# Patient Record
Sex: MALE | NOT HISPANIC OR LATINO | Employment: OTHER | URBAN - METROPOLITAN AREA
[De-identification: names, ages, dates, MRNs, and addresses within clinical notes are randomized per-mention and may not be internally consistent; named-entity substitution may affect disease eponyms.]

---

## 2017-09-15 ENCOUNTER — HOSPITAL ENCOUNTER (OUTPATIENT)
Facility: CLINIC | Age: 42
Discharge: HOME | End: 2017-09-15
Attending: ORTHOPAEDIC SURGERY | Admitting: ORTHOPAEDIC SURGERY
Payer: OTHER MISCELLANEOUS

## 2017-09-15 ENCOUNTER — SURGERY (OUTPATIENT)
Age: 42
End: 2017-09-15

## 2017-09-15 VITALS — HEIGHT: 70 IN | WEIGHT: 185 LBS | BODY MASS INDEX: 26.48 KG/M2

## 2017-09-19 ENCOUNTER — HOSPITAL ENCOUNTER (OUTPATIENT)
Facility: CLINIC | Age: 42
End: 2017-09-19
Attending: COLON & RECTAL SURGERY | Admitting: COLON & RECTAL SURGERY

## 2017-09-20 RX ORDER — LIDOCAINE 40 MG/G
CREAM TOPICAL
Status: CANCELLED | OUTPATIENT
Start: 2017-09-20

## 2017-09-20 RX ORDER — ONDANSETRON 2 MG/ML
4 INJECTION INTRAMUSCULAR; INTRAVENOUS
Status: CANCELLED | OUTPATIENT
Start: 2017-09-20

## 2017-09-27 ENCOUNTER — HOSPITAL ENCOUNTER (OUTPATIENT)
Facility: CLINIC | Age: 42
Discharge: HOME | End: 2017-09-27
Attending: COLON & RECTAL SURGERY | Admitting: COLON & RECTAL SURGERY

## 2017-09-27 ENCOUNTER — SURGERY (OUTPATIENT)
Age: 42
End: 2017-09-27

## 2017-09-27 VITALS
HEIGHT: 70 IN | BODY MASS INDEX: 26.48 KG/M2 | SYSTOLIC BLOOD PRESSURE: 141 MMHG | OXYGEN SATURATION: 100 % | DIASTOLIC BLOOD PRESSURE: 109 MMHG | HEART RATE: 57 BPM | WEIGHT: 185 LBS

## 2017-09-27 LAB — COLONOSCOPY: NORMAL

## 2017-09-27 PROCEDURE — G0500 MOD SEDAT ENDO SERVICE >5YRS: HCPCS | Performed by: COLON & RECTAL SURGERY

## 2017-09-27 PROCEDURE — 88305 TISSUE EXAM BY PATHOLOGIST: CPT | Mod: 26 | Performed by: COLON & RECTAL SURGERY

## 2017-09-27 PROCEDURE — 45380 COLONOSCOPY AND BIOPSY: CPT | Performed by: COLON & RECTAL SURGERY

## 2017-09-27 PROCEDURE — 88305 TISSUE EXAM BY PATHOLOGIST: CPT | Performed by: COLON & RECTAL SURGERY

## 2017-09-27 PROCEDURE — 25000128 H RX IP 250 OP 636: Performed by: COLON & RECTAL SURGERY

## 2017-09-27 PROCEDURE — 99153 MOD SED SAME PHYS/QHP EA: CPT

## 2017-09-27 RX ORDER — ONDANSETRON 2 MG/ML
4 INJECTION INTRAMUSCULAR; INTRAVENOUS
Status: DISCONTINUED | OUTPATIENT
Start: 2017-09-27 | End: 2017-09-27 | Stop reason: HOSPADM

## 2017-09-27 RX ORDER — FENTANYL CITRATE 50 UG/ML
INJECTION, SOLUTION INTRAMUSCULAR; INTRAVENOUS PRN
Status: DISCONTINUED | OUTPATIENT
Start: 2017-09-27 | End: 2017-09-27 | Stop reason: HOSPADM

## 2017-09-27 RX ORDER — LIDOCAINE 40 MG/G
CREAM TOPICAL
Status: DISCONTINUED | OUTPATIENT
Start: 2017-09-27 | End: 2017-09-27 | Stop reason: HOSPADM

## 2017-09-27 RX ADMIN — FENTANYL CITRATE 100 MCG: 50 INJECTION, SOLUTION INTRAMUSCULAR; INTRAVENOUS at 08:44

## 2017-09-27 RX ADMIN — MIDAZOLAM HYDROCHLORIDE 2 MG: 1 INJECTION, SOLUTION INTRAMUSCULAR; INTRAVENOUS at 08:51

## 2017-09-27 RX ADMIN — FENTANYL CITRATE 50 MCG: 50 INJECTION, SOLUTION INTRAMUSCULAR; INTRAVENOUS at 08:54

## 2017-09-27 RX ADMIN — MIDAZOLAM HYDROCHLORIDE 2 MG: 1 INJECTION, SOLUTION INTRAMUSCULAR; INTRAVENOUS at 08:45

## 2017-09-28 LAB — COPATH REPORT: NORMAL

## 2017-10-02 ENCOUNTER — HOSPITAL ENCOUNTER (OUTPATIENT)
Dept: LAB | Facility: CLINIC | Age: 42
Discharge: HOME | End: 2017-10-02
Attending: COLON & RECTAL SURGERY | Admitting: COLON & RECTAL SURGERY

## 2017-10-02 PROCEDURE — 87177 OVA AND PARASITES SMEARS: CPT | Performed by: COLON & RECTAL SURGERY

## 2017-10-02 PROCEDURE — 87209 SMEAR COMPLEX STAIN: CPT | Performed by: COLON & RECTAL SURGERY

## 2017-10-03 LAB
O+P STL MICRO: NORMAL
O+P STL MICRO: NORMAL
SPECIMEN SOURCE: NORMAL

## 2023-09-21 ENCOUNTER — TRANSCRIBE ORDERS (OUTPATIENT)
Dept: OTHER | Age: 48
End: 2023-09-21

## 2023-09-21 DIAGNOSIS — D35.2 PITUITARY ADENOMA (H): Primary | ICD-10-CM

## 2023-09-21 DIAGNOSIS — G50.0 TRIGEMINAL NEURALGIA: ICD-10-CM

## 2023-10-03 ENCOUNTER — TELEPHONE (OUTPATIENT)
Dept: NEUROSURGERY | Facility: CLINIC | Age: 48
End: 2023-10-03
Payer: COMMERCIAL

## 2023-10-03 NOTE — TELEPHONE ENCOUNTER
[facility] Call Center    Phone Message    May a detailed message be left on voicemail: yes     Reason for Call: Other: Patients wife Kelsi is calling to schedule neurosurgery referral. A referral was placed to neuology for trigeminal neuralgia. Please review and call back to schedule.     Action Taken: Message routed to:  [facility] ([facility]    Travel Screening: Not Applicable

## 2023-10-10 NOTE — TELEPHONE ENCOUNTER
Action Mary Anne Choi on 10/10/2023 at 1:25 PM   Action Taken Called Pt to ask where they were seen in [place]. Pt's SO answered and stated that Pt had been seen with [facility]. Also stated they were bringing paper copies of  Pt's record to Appt tomorrow. -JA     Records Requested     October 10, 2023 1:27 PM   [id number] [place] [address]  Raghav   Outcome Called to have imaging immediately pushed to PACS. Imaging resolved into PACS. -JA     Records Requested     October 10, 2023 1:29 PM   [id number] [place] [address]  [address] [place] [address]   P: [phone]   Fax: [phone]   Outcome Sent Urgent request for imaging disc to be overnighted w/ shipping label. Tracking # [id number]       Action 10/12/23 MV 12.49pm   Action Taken Imaging disk received and sent to N for processing       RECORDS RECEIVED FROM: Care Everywhere   REASON FOR VISIT: Pituitary adenoma  Trigeminal neuralgia    Date of Appt: 10/11/23 3:00 pm    NOTES (FOR ALL VISITS) STATUS DETAILS   OFFICE NOTE from referring provider Care Everywhere 9/21/23 (Transcribed Orders), 9/19/23  Adrienne Goodwin MD @[facility]     OFFICE NOTE from other specialist Care Everywhere 9/20/23 Laurel Waters OD  @[facility] Eye    1/6/22 Compa Subramanian MD @[facility]    4/30/21 Garth Polanco MD  @[facility] Neuro     DISCHARGE REPORT from the ER Care Everywhere 5/8/21 Lillian Miller MD  @[facility] ED     MEDICATION LIST Internal    IMAGING  (FOR ALL VISITS)     X-RAY PACS [facility]  9/26/23  XR Lumbar Spine  9/26/23 XR Cervical Spine     MRI (HEAD, NECK, SPINE) In process [facility]  10/3/23  MRA Head Brain    [facility]*  6/22/22  MR Lumbar Spine  2/23/22  MR Brain  2/23/22  MR Cervical Spine  5/17/21 MR Brain

## 2023-10-11 ENCOUNTER — PRE VISIT (OUTPATIENT)
Dept: NEUROSURGERY | Facility: CLINIC | Age: 48
End: 2023-10-11

## 2023-10-12 NOTE — TELEPHONE ENCOUNTER
RECORDS RECEIVED FROM: Care Everywhere   REASON FOR VISIT: Pituitary adenoma/TN   Date of Appt: 10/13/23 1:00pm    NOTES (FOR ALL VISITS) STATUS DETAILS   OFFICE NOTE from referring provider Care Everywhere 9/21/23 (Transcribed Orders), 9/19/23  Alysha Newman, Adrienne Domínguez MD @[facility]      OFFICE NOTE from other specialist Care Everywhere 9/20/23 Laurel Waters OD  @[facility] Eye     1/6/22 Compa Subramanian MD @[facility]     4/30/21 Garth Polanco MD  @[facility] Neuro      DISCHARGE REPORT from the ER Care Everywhere 5/8/21 Lillian Miller MD  @[facility] ED      MEDICATION LIST Internal    IMAGING  (FOR ALL VISITS)     X-RAY PACS [facility]  9/26/23  XR Lumbar Spine  9/26/23 XR Cervical Sp     MRI (HEAD, NECK, SPINE) PACS/Imaging disc received  [facility]  10/3/23  MRA Head Brain     [facility]*  6/22/22  MR Lumbar Spine  2/23/22  MR Brain  2/23/22  MR Cervical Spine  5/17/21 MR Brain

## 2023-10-13 ENCOUNTER — PRE VISIT (OUTPATIENT)
Dept: NEUROSURGERY | Facility: CLINIC | Age: 48
End: 2023-10-13

## 2023-10-13 ENCOUNTER — VIRTUAL VISIT (OUTPATIENT)
Dept: NEUROSURGERY | Facility: CLINIC | Age: 48
End: 2023-10-13
Payer: COMMERCIAL

## 2023-10-13 VITALS — WEIGHT: 198 LBS | BODY MASS INDEX: 27.72 KG/M2 | HEIGHT: 71 IN

## 2023-10-13 DIAGNOSIS — D35.2 PITUITARY ADENOMA (H): ICD-10-CM

## 2023-10-13 DIAGNOSIS — G50.0 TRIGEMINAL NEURALGIA: Primary | ICD-10-CM

## 2023-10-13 PROCEDURE — 99203 OFFICE O/P NEW LOW 30 MIN: CPT | Mod: 95 | Performed by: NURSE PRACTITIONER

## 2023-10-13 ASSESSMENT — PAIN SCALES - GENERAL: PAINLEVEL: MODERATE PAIN (5)

## 2023-10-13 NOTE — PROGRESS NOTES
Virtual Visit Details    Type of service:  Video Visit     Originating Location (pt. Location): Home    Distant Location (provider location):  On-site  Platform used for Video Visit: [facility]  Department of Neurosurgery      Name: Ovidio Sharma  MRN: [id number]  Age: 48 year old  : 1975  Referring provider: No ref. provider found  10/13/2023      Chief Complaint:   Pituitary microadenoma  Right trigeminal neuralgia  Abnormal CTA brain    History of Present Illness:   Ovidio Sharma is a 48 year old male with a history of headaches, pituitary microadenoma who is here today for further evaluation and management of trigeminal neuralgia.  Today I had a video visit with the patient and his wife, Kelsi.  Patient has a long-term history of headaches and had imaging in 2019 and was found to have a pituitary microadenoma. He had a few follow-ups with neurology in [place], [place].  Most recent brain MRI was done in 2022 which showed a Stable circumscribed 9 mm hypoenhancing RIGHT pituitary lesion, compatible with a pituitary microadenoma. He has not had any additional brain MRIs since this.    Patient reports right-sided facial pain, mainly in right V1 and V2 distributions at least since 2019.  He reports intermittent, multiple, daily episodes of, sharp, shooting pain which last for few seconds.  He was previously treated with carbamazepine which significantly helped with the symptoms, but he eventually stopped taking this medication.  Recently, he has been having an increase in facial pain symptoms.  He is not taking carbamazepine at this time.    Patient also reports that on a previous CT angiogram from 2019 he was found to have a right pericallosal artery outpouching.  Patient completed an MR angiogram recently which is reportedly normal.    Review of Systems:   Pertinent items are noted in HPI or as in patient entered ROS below, remainder of complete ROS is negative.     "    No data to display                     Allergies:   Guaifenesin and Dextromethorphan hbr      Past Medical History:  Past Medical History:   Diagnosis Date    Asthma     Hypertension     Migraine      There is no problem list on file for this patient.       Past Surgical History:  Past Surgical History:   Procedure Laterality Date    COLONOSCOPY N/A 2017    Procedure: COMBINED COLONOSCOPY, SINGLE OR MULTIPLE BIOPSY/POLYPECTOMY BY BIOPSY;  COLONOSCOPY ;  Surgeon: Phyllis Cardenas MD;  Location: [facility]    HERNIA REPAIR      none         Family History:   No family history on file.      Social History:   Social History     Tobacco Use    Smoking status: Former     Packs/day: 1     Types: Cigarettes     Quit date: 2022     Years since quittin.7    Smokeless tobacco: Never    Tobacco comments:     1 pack every 2 days   Substance Use Topics    Alcohol use: No     Comment: socially    Drug use: Yes     Types: Marijuana        Physical Exam:   Ht 1.803 m (5' 11\")   Wt 89.8 kg (198 lb)   BMI 27.62 kg/m     General: No acute distress.   Neuro: The patient is fully oriented. Speech is normal. Psych: Normal mood and affect. Behavior is normal.      Imaging:  10/3/2023 MR angiogram:  Normal MRA [place]    2022 MRI brain:  IMPRESSION: Stable circumscribed 9 mm hypoenhancing RIGHT pituitary   lesion, compatible with a pituitary microadenoma.      Assessment:  Pituitary microadenoma  Right trigeminal neuralgia  Abnormal CTA brain    Plan:  For the right trigeminal neuralgia, we will restart him on carbamazepine 100 mg twice daily.  If this is not enough for his facial pain, he can slowly increase the dose up to 300 mg twice daily.  Patient had CBC and BMP recently which are within normal levels.  Patient to follow-up with me in 3 months. His prior brain MRI showed mild involvement of the right cavernous sinus which could be the explanation for his right sided facial pain. Will complete a brain " MRI to evaluate. Prior brain MRI is requested to be pushed to PACS.     For the pituitary microadenoma, he would like to establish care with a provider in [facility].  We will refer him to our colleagues after brain MRI.    Patient has a history of abnormal CTA in the past with an outpouching off right pericallosal artery.  We will review recent MRA with Dr. Irving and will contact the patient with follow-up plans.      Zakia Dominguez CNP  Department of Neurosurgery    I spent 40 minutes on patient care activities related to this encounter on the date of service, including time spent reviewing the chart, obtaining history and examination and in counseling the patient, and in documentation in the electronic medical record.

## 2023-10-13 NOTE — NURSING NOTE
Is the patient currently in the state of [place]? YES    Visit mode:VIDEO    If the visit is dropped, the patient can be reconnected by: VIDEO VISIT: Text to cell phone:   Telephone Information:   Mobile [phone]       Will anyone else be joining the visit? NO  (If patient encounters technical issues they should call [phone])    How would you like to obtain your AVS? MyChart    Are changes needed to the allergy or medication list? No    Reason for visit: Consult    Steffi DE PAZ      Note for check-in: Unable to update employer size, etc for insurance, due to pt not currently working, but still waiting for disability paperwork to go through.

## 2023-10-13 NOTE — LETTER
10/13/2023       RE: Ovidio Sharma  [address]  [place] [address]       Dear Colleague,    Thank you for referring your patient, Ovidio Sharma, to the [facility] NEUROSURGERY CLINIC [place] at [facility]. Please see a copy of my visit note below.      [facility]  Department of Neurosurgery      Name: Ovidio Sharma  MRN: [id number]  Age: 48 year old  : 1975  Referring provider: No ref. provider found  10/13/2023      Chief Complaint:   Pituitary microadenoma  Right trigeminal neuralgia  Abnormal CTA brain    History of Present Illness:   Ovidio Sharma is a 48 year old male with a history of headaches, pituitary microadenoma who is here today for further evaluation and management of trigeminal neuralgia.  Today I had a video visit with the patient and his wife, Kelsi.  Patient has a long-term history of headaches and had imaging in 2019 and was found to have a pituitary microadenoma. He had a few follow-ups with neurology in [place], [place].  Most recent brain MRI was done in 2022 which showed a Stable circumscribed 9 mm hypoenhancing RIGHT pituitary lesion, compatible with a pituitary microadenoma. He has not had any additional brain MRIs since this.    Patient reports right-sided facial pain, mainly in right V1 and V2 distributions at least since .  He reports intermittent, multiple, daily episodes of, sharp, shooting pain which last for few seconds.  He was previously treated with carbamazepine which significantly helped with the symptoms, but he eventually stopped taking this medication.  Recently, he has been having an increase in facial pain symptoms.  He is not taking carbamazepine at this time.    Patient also reports that on a previous CT angiogram from 2019 he was found to have a right pericallosal artery outpouching.  Patient completed an MR angiogram recently which is reportedly  "normal.    Review of Systems:   Pertinent items are noted in HPI or as in patient entered ROS below, remainder of complete ROS is negative.        No data to display                     Allergies:   Guaifenesin and Dextromethorphan hbr      Past Medical History:  Past Medical History:   Diagnosis Date    Asthma     Hypertension     Migraine      There is no problem list on file for this patient.       Past Surgical History:  Past Surgical History:   Procedure Laterality Date    COLONOSCOPY N/A 2017    Procedure: COMBINED COLONOSCOPY, SINGLE OR MULTIPLE BIOPSY/POLYPECTOMY BY BIOPSY;  COLONOSCOPY ;  Surgeon: Phyllis Cardenas MD;  Location:  GI    HERNIA REPAIR      none         Family History:   No family history on file.      Social History:   Social History     Tobacco Use    Smoking status: Former     Packs/day: 1     Types: Cigarettes     Quit date: 2022     Years since quittin.7    Smokeless tobacco: Never    Tobacco comments:     1 pack every 2 days   Substance Use Topics    Alcohol use: No     Comment: socially    Drug use: Yes     Types: Marijuana        Physical Exam:   Ht 1.803 m (5' 11\")   Wt 89.8 kg (198 lb)   BMI 27.62 kg/m     General: No acute distress.   Neuro: The patient is fully oriented. Speech is normal. Psych: Normal mood and affect. Behavior is normal.      Imaging:  10/3/2023 MR angiogram:  Normal MRA [place]    2022 MRI brain:  IMPRESSION: Stable circumscribed 9 mm hypoenhancing RIGHT pituitary   lesion, compatible with a pituitary microadenoma.      Assessment:  Pituitary microadenoma  Right trigeminal neuralgia  Abnormal CTA brain    Plan:  For the right trigeminal neuralgia, we will restart him on carbamazepine 100 mg twice daily.  If this is not enough for his facial pain, he can slowly increase the dose up to 300 mg twice daily.  Patient had CBC and BMP recently which are within normal levels.  Patient to follow-up with me in 3 months. His prior brain " MRI showed mild involvement of the right cavernous sinus which could be the explanation for his right sided facial pain. Will complete a brain MRI to evaluate.     For the pituitary microadenoma, he would like to establish care with a provider in [facility].  We will refer him to our colleagues after brain MRI.    Patient has a history of abnormal CTA in the past with an outpouching off right pericallosal artery.  We will review recent MRA with Dr. Irving and will contact the patient with follow-up plans.    I spent 40 minutes on patient care activities related to this encounter on the date of service, including time spent reviewing the chart, obtaining history and examination and in counseling the patient, and in documentation in the electronic medical record.        Again, thank you for allowing me to participate in the care of your patient.      Sincerely,    KALI Contreras CNP

## 2023-10-13 NOTE — PATIENT INSTRUCTIONS
Restart carbamazepine at 100 mg twice daily and gradually increase the dose to 300 mg twice daily.     MRI Brain as ordered.     Establish care with Edilma Valero PA-C for pituitary microadenoma follow up.     Follow-up with me in 3 months for Trigeminal Neuralgia management.

## 2023-11-09 ENCOUNTER — TELEPHONE (OUTPATIENT)
Dept: NEUROSURGERY | Facility: CLINIC | Age: 48
End: 2023-11-09
Payer: COMMERCIAL

## 2023-11-09 NOTE — TELEPHONE ENCOUNTER
Left detailed message for pt, to call back and confirm his new appt for 11/22 rescheduled from 11/16 due to align with MRI test result.

## 2023-11-20 ENCOUNTER — CARE COORDINATION (OUTPATIENT)
Dept: NEUROSURGERY | Facility: CLINIC | Age: 48
End: 2023-11-20
Payer: COMMERCIAL

## 2023-11-20 NOTE — PROGRESS NOTES
Writer sent patient [employer] message to ask about his MRI for provider appointment on  11/22/2023.     Isamar Villatoro LPN  Neurosurgery

## 2023-12-09 ENCOUNTER — HEALTH MAINTENANCE LETTER (OUTPATIENT)
Age: 48
End: 2023-12-09

## 2024-07-22 ENCOUNTER — ANCILLARY PROCEDURE (OUTPATIENT)
Dept: MRI IMAGING | Facility: CLINIC | Age: 49
End: 2024-07-22
Attending: NURSE PRACTITIONER
Payer: COMMERCIAL

## 2024-07-22 DIAGNOSIS — G50.0 TRIGEMINAL NEURALGIA: ICD-10-CM

## 2024-07-22 RX ORDER — GADOBUTROL 604.72 MG/ML
0.1 INJECTION INTRAVENOUS ONCE
Status: COMPLETED | OUTPATIENT
Start: 2024-07-22 | End: 2024-07-22

## 2024-07-22 RX ADMIN — GADOBUTROL 9.8 ML: 604.72 INJECTION INTRAVENOUS at 13:56

## 2024-07-25 ENCOUNTER — VIRTUAL VISIT (OUTPATIENT)
Dept: NEUROSURGERY | Facility: CLINIC | Age: 49
End: 2024-07-25
Attending: NURSE PRACTITIONER
Payer: COMMERCIAL

## 2024-07-25 VITALS — HEIGHT: 70 IN | WEIGHT: 219 LBS | BODY MASS INDEX: 31.35 KG/M2

## 2024-07-25 DIAGNOSIS — G50.0 TRIGEMINAL NEURALGIA: Primary | ICD-10-CM

## 2024-07-25 PROCEDURE — 99213 OFFICE O/P EST LOW 20 MIN: CPT | Mod: 95 | Performed by: NURSE PRACTITIONER

## 2024-07-25 RX ORDER — GABAPENTIN 300 MG/1
CAPSULE ORAL
COMMUNITY
Start: 2024-07-24

## 2024-07-25 ASSESSMENT — PAIN SCALES - GENERAL: PAINLEVEL: NO PAIN (0)

## 2024-07-25 NOTE — PROGRESS NOTES
"Virtual Visit Details    Type of service:  Video Visit     Originating Location (pt. Location): Home    Distant Location (provider location):  Off-site  Platform used for Video Visit: [facility]  Department of Neurosurgery      Name: Ovidio Sharma  MRN: [id number]  Age: 48 year old  : 1975  Referring provider: Zakia Dominguez  2024      Chief Complaint:   Pituitary microadenoma  Right trigeminal neuralgia  Follow-up    History of Present Illness:   Ovidio Sharma is a 48 year old male with a history of headaches, pituitary microadenoma, right-sided trigeminal neuralgia who is seen today for a follow-up.  Initial visit with me on 10/13/2023.  He reported right-sided facial pain since 2019.  Please see clinic visit notes for additional details.  He was started on carbamazepine 100 mg twice daily.  For the pituitary microadenoma, clinic visit with Ms. Anjum PA-C was recommended.    Today I had a video visit with the patient.  Overall he has been doing well without any facial pain on the current dose of carbamazepine 100 mg twice daily.  He denies any side effects from this medication.    He recently completed a follow-up brain MRI which ruled out vascular compression of the trigeminal nerves.  This showed increased size of the known pituitary macroadenoma.      Review of Systems:   Pertinent items are noted in HPI or as in patient entered ROS below, remainder of complete ROS is negative.        No data to display                 Physical Exam:   Ht 1.778 m (5' 10\")   Wt 99.3 kg (219 lb)   BMI 31.42 kg/m     General: No acute distress.    Neuro: The patient is fully oriented. Speech is normal. Psych: Normal mood and affect. Behavior is normal.        Imagin2024 MRI brain:  Impression:   1. No evidence of skull base or brainstem abnormality or abnormality  along the course of the fifth nerves intracranially. No evidence for  vascular compression of the nerve root " entry zones.  2. Increased size of the known pituitary macroadenoma.  3. Increased size of 2 nonenhancing cystic-appearing lesions  superficial to the right parotid gland. Recommend ultrasound for  further evaluation.       Assessment:  Pituitary microadenoma  Right trigeminal neuralgia  Follow-up    Plan:  As carbamazepine 100 mg twice daily has been controlling his facial pain, we will continue this medication.  Will review imaging with Dr. Irving and will follow-up with the patient if there is any additional recommendations.    We will arrange a visit with Ms. Valero for the diagnosis of pituitary microadenoma.  Message sent to clinic scheduling.       I spent 20 minutes on patient care activities related to this encounter on the date of service, including time spent reviewing the chart, obtaining history and examination and in counseling the patient, and in documentation in the electronic medical record.      Zakia BASS CNP  Department of Neurosurgery

## 2024-07-25 NOTE — PATIENT INSTRUCTIONS
Continue carbamazepine 100 mg twice daily.    Establish care with Edilma Valero PA-C for pituitary microadenoma.

## 2024-07-25 NOTE — LETTER
"2024       RE: Ovidio Sharma  [address]  [place] [address]     Dear Colleague,    Thank you for referring your patient, Ovidio Sharma, to the [facility] NEUROSURGERY CLINIC [place] at [facility]. Please see a copy of my visit note below.      [facility]  Department of Neurosurgery      Name: Ovidio Sharma  MRN: [id number]  Age: 48 year old  : 1975  Referring provider: Zakia Dominguez  2024      Chief Complaint:   Pituitary microadenoma  Right trigeminal neuralgia  Follow-up    History of Present Illness:   Ovidio Sharma is a 48 year old male with a history of headaches, pituitary microadenoma, right-sided trigeminal neuralgia who is seen today for a follow-up.  Initial visit with me on 10/13/2023.  He reported right-sided facial pain since 2019.  Please see clinic visit notes for additional details.  He was started on carbamazepine 100 mg twice daily.  For the pituitary microadenoma, clinic visit with Ms. Anjum PA-C was recommended.    Today I had a video visit with the patient.  Overall he has been doing well without any facial pain on the current dose of carbamazepine 100 mg twice daily.  He denies any side effects from this medication.    He recently completed a follow-up brain MRI which ruled out vascular compression of the trigeminal nerves.  This showed increased size of the known pituitary macroadenoma.      Review of Systems:   Pertinent items are noted in HPI or as in patient entered ROS below, remainder of complete ROS is negative.        No data to display                 Physical Exam:   Ht 1.778 m (5' 10\")   Wt 99.3 kg (219 lb)   BMI 31.42 kg/m     General: No acute distress.    Neuro: The patient is fully oriented. Speech is normal. Psych: Normal mood and affect. Behavior is normal.        Imagin2024 MRI brain:  Impression:   1. No evidence of skull base or brainstem " abnormality or abnormality  along the course of the fifth nerves intracranially. No evidence for  vascular compression of the nerve root entry zones.  2. Increased size of the known pituitary macroadenoma.  3. Increased size of 2 nonenhancing cystic-appearing lesions  superficial to the right parotid gland. Recommend ultrasound for  further evaluation.       Assessment:  Pituitary microadenoma  Right trigeminal neuralgia  Follow-up    Plan:  As carbamazepine 100 mg twice daily has been controlling his facial pain, we will continue this medication.  Will review imaging with Dr. Irving and will follow-up with the patient if there is any additional recommendations.    We will arrange a visit with Ms. Valero for the diagnosis of pituitary microadenoma.  Message sent to clinic scheduling.       I spent 20 minutes on patient care activities related to this encounter on the date of service, including time spent reviewing the chart, obtaining history and examination and in counseling the patient, and in documentation in the electronic medical record.        Again, thank you for allowing me to participate in the care of your patient.      Sincerely,    KALI Contreras CNP

## 2024-09-27 ENCOUNTER — OFFICE VISIT (OUTPATIENT)
Dept: NEUROSURGERY | Facility: CLINIC | Age: 49
End: 2024-09-27
Attending: NURSE PRACTITIONER
Payer: COMMERCIAL

## 2024-09-27 VITALS
SYSTOLIC BLOOD PRESSURE: 109 MMHG | RESPIRATION RATE: 16 BRPM | OXYGEN SATURATION: 98 % | HEART RATE: 64 BPM | DIASTOLIC BLOOD PRESSURE: 74 MMHG

## 2024-09-27 DIAGNOSIS — D35.2 PITUITARY ADENOMA (H): Primary | ICD-10-CM

## 2024-09-27 PROCEDURE — 99214 OFFICE O/P EST MOD 30 MIN: CPT | Performed by: PHYSICIAN ASSISTANT

## 2024-09-27 RX ORDER — LOSARTAN POTASSIUM 50 MG/1
50 TABLET ORAL DAILY
COMMUNITY

## 2024-09-27 RX ORDER — FERROUS FUMARATE 324(106)MG
325 TABLET ORAL DAILY
COMMUNITY

## 2024-09-27 ASSESSMENT — PAIN SCALES - GENERAL: PAINLEVEL: NO PAIN (0)

## 2024-09-27 NOTE — PROGRESS NOTES
[facility]  Department of Neurosurgery  Center for Skull Base and Pituitary Surgery    Name: Ovidio Sharma  MRN: [id number]  Age: 49 year old  : 1975  2024      Chief Complaint:   Pituitary adenoma, new patient visit    History of Present Illness:   Ovidio Sharma is a 49 year old male with a history of hypertension, headaches and facial pain who is seen today as a new patient regarding a pituitary adenoma, discovered in  upon workup for headaches. He was living in [place] at the time. Today he's here accompanied by his wife, Kelsi. He reports a history of low testosterone but has not taken supplementation due to insurance issues with coverage. He feels generally well; his right sided trigeminal neuralgia is well controlled with carbamazepine and he's following here with Zakia Dominguez CNP. At his last visit in 2024 his MRI revealed some growth of his pituitary adenoma so he was referred here. He denies new blurry or double vision, though did this year get a glasses prescription which is new for him. He feels the glasses make him feel worse and is planning on going to see his Optometry office again for discussion.     He is currently unemployed, seeking state assistance. He's . They do not have children together but Ovidio has grown children. He's a nonsmoker.    Review of Systems:   Pertinent items are noted in HPI or as in patient entered ROS below, remainder of complete ROS is negative.     Physical Exam:   /74 (BP Location: Right arm, Patient Position: Sitting)   Pulse 64   Resp 16   SpO2 98%   General: No acute distress.    Eyes: Conjunctivae are normal.  MSK: Moves all extremities.  No obvious deformity.  Neuro: The patient is fully oriented. Speech is normal. Extraocular movements are intact without nystagmus. Facial nerve function is normal, rated as a House Brackmann 1. Gait is normal.   Psych: Normal mood and affect. Behavior is normal.    Face/skin:  "On exam there are 1-2 soft, superficial masses in the right pre-auricular region.     Imaging:  We reviewed the MRI from 7/22/2024 which reveals a hypoenhancing sellar mass without optic chiasm compression. This mass has grown from 10 x 12 x 9mm to 14 x 13 x 12 mm currently over the past couple of years. The pituitary stalk is deviated to the left.     Of note, there is also mention of superficial T2 hyperintensities over the right parotid gland. The patient is aware of these and in fact had 2 removed from the left side in the past by Dermatology; these returned \"epidermal cysts\".     Assessment:  Pituitary adenoma, new patient consult  Right pre-auricular cysts    Plan:  We reviewed the natural history of pituitary adenomas and the differential diagnosis in this area. We also discussed options for management including observation and endonasal resection. We reviewed his imaging findings which reveal a slow growing pituitary adenoma, currently without optic chiasm involvement. I'd like to check a pituitary panel of labs to rule out hormone derangement, other than the testosterone, and will plan to call him with results. He'd like to speak with an Endocrinologist regarding testosterone replacement, referral placed today. We discussed that given his young age and the slow growth of his adenoma, it's likely he'll require intervention in his lifetime, but it is not urgent currently. He'd like to continue to observe for now which is reasonable. We'll tentatively plan for follow up in 1 year with repeat imaging, assuming his lab work returns reasonably normal. He was encouraged to reach out with questions or new concerns in the meantime.  The patient would like to return to his outside Dermatologist for treatment of these.         Edilma Valero PA-C  Department of Neurosurgery    "

## 2024-09-27 NOTE — LETTER
2024       RE: Ovidio Sharma  [address] [place] [address]     Dear Colleague,    Thank you for referring your patient, Ovidio Sharma, to the [facility] [place] at [facility]. Please see a copy of my visit note below.      [facility]  Department of Neurosurgery  Center for Skull Base and Pituitary Surgery    Name: Ovidio Sharma  MRN: [id number]  Age: 49 year old  : 1975  2024      Chief Complaint:   Pituitary adenoma, new patient visit    History of Present Illness:   Ovidio Sharma is a 49 year old male with a history of hypertension, headaches and facial pain who is seen today as a new patient regarding a pituitary adenoma, discovered in  upon workup for headaches. He was living in [place] at the time. Today he's here accompanied by his wife, Kelsi. He reports a history of low testosterone but has not taken supplementation due to insurance issues with coverage. He feels generally well; his right sided trigeminal neuralgia is well controlled with carbamazepine and he's following here with Zakia Dominguez CNP. At his last visit in 2024 his MRI revealed some growth of his pituitary adenoma so he was referred here. He denies new blurry or double vision, though did this year get a glasses prescription which is new for him. He feels the glasses make him feel worse and is planning on going to see his Optometry office again for discussion.     He is currently unemployed, seeking state assistance. He's . They do not have children together but Ovidio has grown children. He's a nonsmoker.    Review of Systems:   Pertinent items are noted in HPI or as in patient entered ROS below, remainder of complete ROS is negative.     Physical Exam:   /74 (BP Location: Right arm, Patient Position: Sitting)   Pulse 64   Resp 16   SpO2 98%   General: No acute distress.    Eyes:  "Conjunctivae are normal.  MSK: Moves all extremities.  No obvious deformity.  Neuro: The patient is fully oriented. Speech is normal. Extraocular movements are intact without nystagmus. Facial nerve function is normal, rated as a House Brackmann 1. Gait is normal.   Psych: Normal mood and affect. Behavior is normal.    Face/skin: On exam there are 1-2 soft, superficial masses in the right pre-auricular region.     Imaging:  We reviewed the MRI from 7/22/2024 which reveals a hypoenhancing sellar mass without optic chiasm compression. This mass has grown from 10 x 12 x 9mm to 14 x 13 x 12 mm currently over the past couple of years. The pituitary stalk is deviated to the left.     Of note, there is also mention of superficial T2 hyperintensities over the right parotid gland. The patient is aware of these and in fact had 2 removed from the left side in the past by Dermatology; these returned \"epidermal cysts\".     Assessment:  Pituitary adenoma, new patient consult  Right pre-auricular cysts    Plan:  We reviewed the natural history of pituitary adenomas and the differential diagnosis in this area. We also discussed options for management including observation and endonasal resection. We reviewed his imaging findings which reveal a slow growing pituitary adenoma, currently without optic chiasm involvement. I'd like to check a pituitary panel of labs to rule out hormone derangement, other than the testosterone, and will plan to call him with results. He'd like to speak with an Endocrinologist regarding testosterone replacement, referral placed today. We discussed that given his young age and the slow growth of his adenoma, it's likely he'll require intervention in his lifetime, but it is not urgent currently. He'd like to continue to observe for now which is reasonable. We'll tentatively plan for follow up in 1 year with repeat imaging, assuming his lab work returns reasonably normal. He was encouraged to reach out with " questions or new concerns in the meantime.  The patient would like to return to his outside Dermatologist for treatment of these.         Edilma Valero PA-C  Department of Neurosurgery      Again, thank you for allowing me to participate in the care of your patient.      Sincerely,    Edilma Valero PA-C

## 2024-09-27 NOTE — PROGRESS NOTES
[facility]  Department of Neurosurgery  Center for Skull Base and Pituitary Surgery    Name: Ovidio Sharma  MRN: [id number]  Age: 49 year old  : 1975  2024      Chief Complaint:   Pituitary adenoma, new patient visit    History of Present Illness:   Ovidio Sharma is a 49 year old male with a history of *** who is seen today for ***      Review of Systems:   Pertinent items are noted in HPI or as in patient entered ROS below, remainder of complete ROS is negative.     Physical Exam:   There were no vitals taken for this visit.   General: No acute distress.    Eyes: Conjunctivae are normal.  MSK: Moves all extremities.  No obvious deformity.  Neuro: The patient is fully oriented. Speech is normal. Extraocular movements are intact without nystagmus. Facial sensation is intact in V1, V2, V3 distributions. Facial nerve function is normal, rated as a House Brackmann ***. Gait is normal.   Psych: Normal mood and affect. Behavior is normal.      Imaging:  ***     Assessment:  ***    Plan:  ***       Edilma Valero PA-C  Department of Neurosurgery

## 2024-09-27 NOTE — PATIENT INSTRUCTIONS
Complete lab work around 8AM, fasting, at your convenience. I'll call or MyChart results to you.  I placed a referral to Endocrinology for discussion of testosterone supplement.    We'll tentatively plan to see you back in 1 year with repeat MRI prior.  Please don't hesitate to reach out sooner with new concerns.

## 2024-09-28 ENCOUNTER — LAB (OUTPATIENT)
Dept: LAB | Facility: CLINIC | Age: 49
End: 2024-09-28
Payer: COMMERCIAL

## 2024-09-28 DIAGNOSIS — D35.2 PITUITARY ADENOMA (H): ICD-10-CM

## 2024-09-28 LAB
ANION GAP SERPL CALCULATED.3IONS-SCNC: 8 MMOL/L (ref 7–15)
BUN SERPL-MCNC: 8.6 MG/DL (ref 6–20)
CALCIUM SERPL-MCNC: 8.8 MG/DL (ref 8.8–10.4)
CHLORIDE SERPL-SCNC: 108 MMOL/L (ref 98–107)
CORTIS SERPL-MCNC: 6.2 UG/DL
CREAT SERPL-MCNC: 1.09 MG/DL (ref 0.67–1.17)
EGFRCR SERPLBLD CKD-EPI 2021: 84 ML/MIN/1.73M2
FSH SERPL IRP2-ACNC: 4.6 MIU/ML (ref 1.5–12.4)
GLUCOSE SERPL-MCNC: 99 MG/DL (ref 70–99)
HCO3 SERPL-SCNC: 25 MMOL/L (ref 22–29)
LH SERPL-ACNC: 7 MIU/ML (ref 1.7–8.6)
POTASSIUM SERPL-SCNC: 4.1 MMOL/L (ref 3.4–5.3)
PROLACTIN SERPL 3RD IS-MCNC: 9 NG/ML (ref 4–15)
SODIUM SERPL-SCNC: 141 MMOL/L (ref 135–145)
T4 FREE SERPL-MCNC: 1.09 NG/DL (ref 0.9–1.7)
TSH SERPL DL<=0.005 MIU/L-ACNC: 1.12 UIU/ML (ref 0.3–4.2)

## 2024-09-28 PROCEDURE — 83003 ASSAY GROWTH HORMONE (HGH): CPT | Performed by: PHYSICIAN ASSISTANT

## 2024-09-28 PROCEDURE — 83002 ASSAY OF GONADOTROPIN (LH): CPT | Performed by: PHYSICIAN ASSISTANT

## 2024-09-28 PROCEDURE — 84146 ASSAY OF PROLACTIN: CPT | Performed by: PHYSICIAN ASSISTANT

## 2024-09-28 PROCEDURE — 80048 BASIC METABOLIC PNL TOTAL CA: CPT | Performed by: PATHOLOGY

## 2024-09-28 PROCEDURE — 36415 COLL VENOUS BLD VENIPUNCTURE: CPT | Performed by: PATHOLOGY

## 2024-09-28 PROCEDURE — 84439 ASSAY OF FREE THYROXINE: CPT | Performed by: PHYSICIAN ASSISTANT

## 2024-09-28 PROCEDURE — 84403 ASSAY OF TOTAL TESTOSTERONE: CPT | Performed by: PHYSICIAN ASSISTANT

## 2024-09-28 PROCEDURE — 82533 TOTAL CORTISOL: CPT | Performed by: PHYSICIAN ASSISTANT

## 2024-09-28 PROCEDURE — 83001 ASSAY OF GONADOTROPIN (FSH): CPT | Performed by: PHYSICIAN ASSISTANT

## 2024-09-28 PROCEDURE — 84305 ASSAY OF SOMATOMEDIN: CPT | Performed by: PHYSICIAN ASSISTANT

## 2024-09-28 PROCEDURE — 99000 SPECIMEN HANDLING OFFICE-LAB: CPT | Performed by: PATHOLOGY

## 2024-09-28 PROCEDURE — 84443 ASSAY THYROID STIM HORMONE: CPT | Performed by: PHYSICIAN ASSISTANT

## 2024-09-28 PROCEDURE — 82024 ASSAY OF ACTH: CPT | Performed by: PHYSICIAN ASSISTANT

## 2024-09-30 ENCOUNTER — TELEPHONE (OUTPATIENT)
Dept: ENDOCRINOLOGY | Facility: CLINIC | Age: 49
End: 2024-09-30

## 2024-09-30 LAB
ACTH PLAS-MCNC: 11 PG/ML
GH SERPL-MCNC: <0.1 UG/L

## 2024-10-01 LAB — IGF-I BLD-MCNC: 83 NG/ML (ref 69–224)

## 2024-10-02 LAB — TESTOST SERPL-MCNC: 498 NG/DL (ref 240–950)

## 2024-10-07 ENCOUNTER — TELEPHONE (OUTPATIENT)
Dept: ENDOCRINOLOGY | Facility: CLINIC | Age: 49
End: 2024-10-07
Payer: COMMERCIAL

## 2024-10-07 NOTE — TELEPHONE ENCOUNTER
Left Voicemail (1st Attempt) for the patient to call back and schedule the following:    Appointment type: New Pituitary   Provider: see below   Return date: Sooner than visit on 4/14   Specialty phone number: [phone]  Additional appointment(s) needed:   Additonal Notes: Mk DIAZ Lynn A, MD  [id number]  To schedulers : please offer to move forward on schedule with either  Tray Marks, Madan,  Bernard Jason Kizilgul, Araki, Kohlenberg, Kristan, Bantle, Moheet, Chow, Seaquist using NON-CALL week open new/OTTONIEL (60 minutes) or 2 back to back 30 minute OTTONIEL spaces.       Examples:  10/10 Tonie in person csc  11/20 Shante virtual csc  11/27 Terraki in person csc  12/3 Ahmed virtual csc  12/27 Ahmed in person csc  12/30 Ahmed in person csc  12/31 Ahmed virtual csc    Please note that the above appointment(s) will require manual scheduling as they are marked as OTTONIEL and will not appear using auto search. Do not schedule the patient if another patient has already been scheduled in the requested appointment slot.     Josephine Osuna on 10/7/2024 at 11:36 AM

## 2024-10-08 NOTE — CONFIDENTIAL NOTE
RECORDS RECEIVED FROM: internal /ce    DATE RECEIVED: 10.10.24    NOTES (FOR ALL VISITS) STATUS DETAILS   OFFICE NOTES from referring provider internal    Edilma Valero PA-C      OFFICE NOTES from other specialist internal  10.13.23 Alberto YAN    9/20/23 Laurel Waters OD  @[facility]     1/6/22 Compa Subramanian MD @[facility]     4/30/21 Garth Polanco MD  @[facility] Neuro   MEDICATION LIST internal     IMAGING      MRI (BRAIN) internal  7.22.24   LABS     DIABETES: HBGA1C, CREATININE, FASTING LIPIDS, MICROALBUMIN URINE, POTASSIUM, TSH, T4    THYROID: TSH, T4, CBC, THYRODLONULIN, TOTAL T3, FREE T4, CALCITONIN, CEA internal /ce               

## 2024-10-09 NOTE — PROGRESS NOTES
10/09/24 9:32 AM : Appointment reminder phone call made to patient.Pt verbalized understanding and Provided address  Radha Krause CMA

## 2024-10-09 NOTE — PROGRESS NOTES
Endocrinology Clinic Visit 10/8/2024    NAME:  Ovidio Sharma  PCP:  No Ref-Primary, Physician  MRN:  [id number]  Reason for Consult:  Pituitary adenoma; history of low testosterone   Requesting Provider:  Edilma Valero       HISTORY OF PRESENT ILLNESS  Ovidio Sharma is a 49 year old male with who is here for initial evaluation and management of pituitary adenoma and history of low testosterone by Edilma Valero .  He has a PMH of hypertension, headaches and facial pain who is seen today as a new patient regarding a pituitary adenoma, discovered in 2021 upon workup for headaches. MRI in 2021 revealed a 9 mm microadenoma and he has followed since 2021. MRI in 2024 showed increase in adenoma size to 14 mm without compression to optic chiasm. His pituitary hormone levels are normal except borderline cortisol with 6,2.  He reports a history of low testosterone but has not taken supplementation due to insurance issues with coverage.   His weight was around 160 lbs and increased to 230 lbs in last 4 months. He changed his diet and decreased to 208 lbs. He thinks weight change is related to his diet  His libido is ok but he has erectile dysfunction. He rarely has morning erection.  Denies polyuria and polydipsia. Wakes up for urination for 3 times at night.  He has PMH of hypertension and trigeminal neuralgia. He takes prednisone when he has headache flares and he did not have flares for last 2 years    History of problem is:  Pituitary lesion diagnosed 2021  Found incidentally or upon evaluation for headaches   History of pituitary surgery: None  History of pituitary/brain radiation: None      Past Medical/Surgical History:  Past Medical History:   Diagnosis Date    Asthma     Hypertension     Migraine      Past Surgical History:   Procedure Laterality Date    COLONOSCOPY N/A 9/27/2017    Procedure: COMBINED COLONOSCOPY, SINGLE OR MULTIPLE BIOPSY/POLYPECTOMY BY BIOPSY;  COLONOSCOPY ;  Surgeon: Phyllis Cardenas  MD Meri;  Location:  GI    HERNIA REPAIR      none         Allergies  Allergies   Allergen Reactions    Guaifenesin Anaphylaxis    Dextromethorphan Hbr      Other Reaction(s): Edema    Hives and throat swelling         Family History  family history is not on file.    Social History  Social History     Tobacco Use    Smoking status: Former     Current packs/day: 0.00     Types: Cigarettes     Quit date: 2022     Years since quittin.7    Smokeless tobacco: Never    Tobacco comments:     1 pack every 2 days   Substance Use Topics    Alcohol use: No     Comment: socially     He is  and lives his wife. He has 4 biological kids. Unemployed.    Does not smoke cigarette or drink alcohol. Smoke [employer].    Physical Exam    GENERAL :  In no apparent distress  EYES: No scleral icterus,  No proptosis  NECK: No visible masses.   RESP: Normal breathing  NEURO: awake, alert, responds appropriately to questions.      DATA REVIEW  Labs/Imaging     Latest Reference Range & Units 24 09:53   Adrenal Corticotropin <47 pg/mL 11   Cortisol Serum ug/dL 6.2   FSH 1.5 - 12.4 mIU/mL 4.6   Luteinizing Hormone 1.7 - 8.6 mIU/mL 7.0   Prolactin 4 - 15 ng/mL 9   T4 Free 0.90 - 1.70 ng/dL 1.09   Testosterone Total 240 - 950 ng/dL 498   TSH 0.30 - 4.20 uIU/mL 1.12   Ins Growth Factor 1 69 - 224 ng/mL 83       MR brain   9 mm hypoenhancing pituitary lesion, most likely representing   pituitary microadenoma.     MR brain   Stable circumscribed 9 mm hypoenhancing RIGHT pituitary   lesion, compatible with a pituitary microadenoma.     MR brain   There is a hypoenhancing mass within the sella that measures 14 x 13 x12 mm compared to 10 x 12 x 9 mm on the prior study and is consistent with known pituitary adenoma. There is no mass effect on the optic chiasm. The infundibulum remains shifted to the left. This appears to encroach on the right cavernous sinus similar to the prior study.    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 He has been followed by MRI since then.  His last MRI reveled 14 mm macroadenoma without mass effect on the optic chiasm. His pituitary hormone test are normal except borderline cortisol level of 6.2. He does not have signs and symptoms related pituitary hormone excess or deficiency. The patient was told that the only treatment option other than follow-up was surgery. However, we emphasized that since the hormone levels related to the tumor were currently normal and there were no mass effect related to the tumor, annual follow-up would be more appropriate. The patient stated that he did not want surgery and wanted annual follow-up. Since his cortisol level measured at 9.53 was 6.2 we considered to see a 8 am cortisol level. The patient was also consulted for a history of low testosterone. Despite sexual desire, he had erectile dysfunction. However, we stated that his last testosterone level was 498 and that there was no need for testosterone replacement. We explained that it would be more appropriate for him to be followed by a urologist for erectile dysfunction.    Plan  Follow in clinic 1 year later  Lab for morning 8 am cortisol  Pituitary MR 1 year later

## 2024-10-10 ENCOUNTER — PRE VISIT (OUTPATIENT)
Dept: ENDOCRINOLOGY | Facility: CLINIC | Age: 49
End: 2024-10-10

## 2024-10-10 ENCOUNTER — OFFICE VISIT (OUTPATIENT)
Dept: ENDOCRINOLOGY | Facility: CLINIC | Age: 49
End: 2024-10-10
Payer: COMMERCIAL

## 2024-10-10 VITALS
SYSTOLIC BLOOD PRESSURE: 130 MMHG | BODY MASS INDEX: 30.81 KG/M2 | HEIGHT: 69 IN | WEIGHT: 208 LBS | OXYGEN SATURATION: 100 % | HEART RATE: 53 BPM | DIASTOLIC BLOOD PRESSURE: 90 MMHG

## 2024-10-10 DIAGNOSIS — D35.2 PITUITARY ADENOMA (H): ICD-10-CM

## 2024-10-10 DIAGNOSIS — N52.9 ERECTILE DYSFUNCTION, UNSPECIFIED ERECTILE DYSFUNCTION TYPE: Primary | ICD-10-CM

## 2024-10-10 PROCEDURE — 99205 OFFICE O/P NEW HI 60 MIN: CPT | Mod: GC

## 2024-10-10 RX ORDER — FLUTICASONE PROPIONATE 50 MCG
2 SPRAY, SUSPENSION (ML) NASAL
COMMUNITY
Start: 2024-09-03

## 2024-10-10 RX ORDER — PREDNISONE 20 MG/1
TABLET ORAL
COMMUNITY
Start: 2024-07-16

## 2024-10-10 ASSESSMENT — PAIN SCALES - GENERAL: PAINLEVEL: NO PAIN (0)

## 2024-10-10 NOTE — LETTER
10/10/2024       RE: Ovidio Sharma  [address]  [address] [place] [address]     Dear Colleague,    Thank you for referring your patient, Ovidio Sharma, to the [facility] ENDOCRINOLOGY [facility]IC [employer] at [facility]. Please see a copy of my visit note below.    Endocrinology Clinic Visit 10/8/2024    NAME:  Ovidio Sharma  PCP:  No Ref-Primary, Physician  MRN:  [id number]  Reason for Consult:  Pituitary adenoma; history of low testosterone   Requesting Provider:  Edilma Valero       HISTORY OF PRESENT ILLNESS  Ovidio Sharma is a 49 year old male with who is here for initial evaluation and management of pituitary adenoma and history of low testosterone by Edilma Valero .  He has a PMH of hypertension, headaches and facial pain who is seen today as a new patient regarding a pituitary adenoma, discovered in 2021 upon workup for headaches. MRI in 2021 revealed a 9 mm microadenoma and he has followed since 2021. MRI in 2024 showed increase in adenoma size to 14 mm without compression to optic chiasm. His pituitary hormone levels are normal except borderline cortisol with 6,2.  He reports a history of low testosterone but has not taken supplementation due to insurance issues with coverage.   His weight was around 160 lbs and increased to 230 lbs in last 4 months. He changed his diet and decreased to 208 lbs. He thinks weight change is related to his diet  His libido is ok but he has erectile dysfunction. He rarely has morning erection.  Denies polyuria and polydipsia. Wakes up for urination for 3 times at night.  He has PMH of hypertension and trigeminal neuralgia. He takes prednisone when he has headache flares and he did not have flares for last 2 years    History of problem is:  Pituitary lesion diagnosed 2021  Found incidentally or upon evaluation for headaches   History of pituitary surgery: None  History of pituitary/brain radiation:  None      Past Medical/Surgical History:  Past Medical History:   Diagnosis Date     Asthma      Hypertension      Migraine      Past Surgical History:   Procedure Laterality Date     COLONOSCOPY N/A 2017    Procedure: COMBINED COLONOSCOPY, SINGLE OR MULTIPLE BIOPSY/POLYPECTOMY BY BIOPSY;  COLONOSCOPY ;  Surgeon: Phyllis Cardenas MD;  Location:  GI     HERNIA REPAIR       none         Allergies  Allergies   Allergen Reactions     Guaifenesin Anaphylaxis     Dextromethorphan Hbr      Other Reaction(s): Edema    Hives and throat swelling         Family History  family history is not on file.    Social History  Social History     Tobacco Use     Smoking status: Former     Current packs/day: 0.00     Types: Cigarettes     Quit date: 2022     Years since quittin.7     Smokeless tobacco: Never     Tobacco comments:     1 pack every 2 days   Substance Use Topics     Alcohol use: No     Comment: socially     He is  and lives his wife. He has 4 biological kids. Unemployed.    Does not smoke cigarette or drink alcohol. Smoke [employer].    Physical Exam    GENERAL :  In no apparent distress  EYES: No scleral icterus,  No proptosis  NECK: No visible masses.   RESP: Normal breathing  NEURO: awake, alert, responds appropriately to questions.      DATA REVIEW  Labs/Imaging     Latest Reference Range & Units 24 09:53   Adrenal Corticotropin <47 pg/mL 11   Cortisol Serum ug/dL 6.2   FSH 1.5 - 12.4 mIU/mL 4.6   Luteinizing Hormone 1.7 - 8.6 mIU/mL 7.0   Prolactin 4 - 15 ng/mL 9   T4 Free 0.90 - 1.70 ng/dL 1.09   Testosterone Total 240 - 950 ng/dL 498   TSH 0.30 - 4.20 uIU/mL 1.12   Ins Growth Factor 1 69 - 224 ng/mL 83       MR brain   9 mm hypoenhancing pituitary lesion, most likely representing   pituitary microadenoma.     MR brain   Stable circumscribed 9 mm hypoenhancing RIGHT pituitary   lesion, compatible with a pituitary microadenoma.     MR brain   There is a hypoenhancing mass  within the sella that measures 14 x 13 x12 mm compared to 10 x 12 x 9 mm on the prior study and is consistent with known pituitary adenoma. There is no mass effect on the optic chiasm. The infundibulum remains shifted to the left. This appears to encroach on the right cavernous sinus similar to the prior study.    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 He has been followed by MRI since then.  His last MRI reveled 14 mm macroadenoma without mass effect on the optic chiasm. His pituitary hormone test are normal except borderline cortisol level of 6.2. He does not have signs and symptoms related pituitary hormone excess or deficiency. The patient was told that the only treatment option other than follow-up was surgery. However, we emphasized that since the hormone levels related to the tumor were currently normal and there were no mass effect related to the tumor, annual follow-up would be more appropriate. The patient stated that he did not want surgery and wanted annual follow-up. Since his cortisol level measured at 9.53 was 6.2 we considered to see a 8 am cortisol level. The patient was also consulted for a history of low testosterone. Despite sexual desire, he had erectile dysfunction. However, we stated that his last testosterone level was 498 and that there was no need for testosterone replacement. We explained that it would be more appropriate for him to be followed by a urologist for erectile dysfunction.    Plan  Follow in clinic 1 year later  Lab for morning 8 am cortisol  Pituitary MR 1 year later      10/09/24 9:32 AM : Appointment reminder phone call made to patient.Pt verbalized understanding and Provided address  Radha Krause CMA      Physician Attestation  I, Cielo Whitaker MD, saw this patient and agree with the findings and plan of care as documented in the note by   Andrea.    Items personally reviewed/procedural attestation: vitals, labs, and imaging and agree with the interpretation documented in the note.      48yo M w/PMHx of trigeminal neuralgia here w/pituitary adenoma. Has shown increase from 9mm to 14mm 2021> 2024.  Does have history of ED so some concern for secondary hypogonadsim.  Endocrine hormones without obvious excess-- prolactin wnl, no clinical evidence of Cushings, IGF1 low normal  Testosterone in normal range- 498- discussed testosterone supplementation would not be helpful for ED symptoms. Refer to urology for additional evaluation, incomplete response with PDE inhibitor  Cortisol collected at 10a- borderline. Recommended retesting at 8am to get clear value.   Discussed surgical indications would be hyperfunctioning (doesn't have), compressive symptoms (doesn't have) and hypofunctioning -- as far as we can tell doesn't symptomatically have. Will clarify with additional cortisol testing.  Has established with [facility], discussed monitoring -- mass is growing. I suspect (and told him so) that he will need intervention at some point. He wishes to delay as long as possible.    We will plan to meet back in 1 year with MRI. If mass stable without additional/change in symptoms, will monitor with annual to biannual MRIs without additional hormone checks.     He (and wife) are agreeable to plan.         Cielo Whitaker MD      Again, thank you for allowing me to participate in the care of your patient.      Sincerely,    Reji Schilling MD

## 2024-10-10 NOTE — NURSING NOTE
"Chief Complaint   Patient presents with    New Patient     Testosterone and pituitary     Vital signs:      BP: (!) 130/90 Pulse: 53     SpO2: 100 %     Height: 175.3 cm (5' 9\") Weight: 94.3 kg (208 lb)  Estimated body mass index is 30.72 kg/m  as calculated from the following:    Height as of this encounter: 1.753 m (5' 9\").    Weight as of this encounter: 94.3 kg (208 lb).        "

## 2024-10-10 NOTE — PROGRESS NOTES
Physician Attestation   I, Cielo Whitaker MD, saw this patient and agree with the findings and plan of care as documented in the note by Dr Mendez.    Items personally reviewed/procedural attestation: vitals, labs, and imaging and agree with the interpretation documented in the note.      50yo M w/PMHx of trigeminal neuralgia here w/pituitary adenoma. Has shown increase from 9mm to 14mm 2021> 2024.  Does have history of ED so some concern for secondary hypogonadsim.  Endocrine hormones without obvious excess-- prolactin wnl, no clinical evidence of Cushings, IGF1 low normal  Testosterone in normal range- 498- discussed testosterone supplementation would not be helpful for ED symptoms. Refer to urology for additional evaluation, incomplete response with PDE inhibitor  Cortisol collected at 10a- borderline. Recommended retesting at 8am to get clear value.   Discussed surgical indications would be hyperfunctioning (doesn't have), compressive symptoms (doesn't have) and hypofunctioning -- as far as we can tell doesn't symptomatically have. Will clarify with additional cortisol testing.  Has established with [facility], discussed monitoring -- mass is growing. I suspect (and told him so) that he will need intervention at some point. He wishes to delay as long as possible.    We will plan to meet back in 1 year with MRI. If mass stable without additional/change in symptoms, will monitor with annual to biannual MRIs without additional hormone checks.     He (and wife) are agreeable to plan.         Cielo Whitaker MD

## 2024-10-18 ENCOUNTER — PRE VISIT (OUTPATIENT)
Dept: UROLOGY | Facility: CLINIC | Age: 49
End: 2024-10-18
Payer: COMMERCIAL

## 2024-10-18 NOTE — TELEPHONE ENCOUNTER
MEDICAL RECORDS REQUEST   [facility]  Urology Clinic  [address] [place] [address]  PHONE: [phone]  Fax: [phone]        FUTURE VISIT INFORMATION                                                   Ovidio Sharma, : 1975 scheduled for future visit at [facility]    APPOINTMENT INFORMATION:  Date: 10/25/24 @ 2:45 pm   Provider:  Aung Villatoro PA-C   Reason for Visit/Diagnosis: Erectile dysfunction, unspecified erectile dysfunction type    REFERRAL INFORMATION:  Referring provider:  Reji Schilling MD   Specialty: Endocrinology  [facility]  Clinic contact number:  [phone]     RECORDS REQUESTED FOR VISIT                                                     NOTES  STATUS/DETAILS   OFFICE NOTE from referring provider  yes  10/10/24 Reji Schilling MD @[facility]     MEDICATION LIST  yes   LABS     URINALYSIS (UA)  yes   URINE CYTOLOGY  yes   LABS (CMP, RENAL PANEL, CBC)  yes   IMAGES  Yes   [facility]  24 MR Lumbar Spine  23 XR Lumbar Spine    [place]  22 MR Lumbar Spine       PRE-VISIT CHECKLIST      Joint diagnostic appointment coordinated correctly          (ensure right order & amount of time) Yes   RECORD COLLECTION COMPLETE Yes     Records Requested     2024 8:43 AM   [id number]   Facility  [facility]   Outcome 8:51 am Sent request for imaging to be pushed to PACS. -EVE Choi on 10/23/2024 at 8:12 AM Imaging resolved into PACS. -EVE

## 2024-10-18 NOTE — TELEPHONE ENCOUNTER
Reason for visit: ED     Relevant information: was referred by Reji Schilling MD for ED, testosterone was 498    Records/imaging/labs/orders: Images pending    At Rooming: Virtual visit      Russ Morales  10/18/2024  2:51 PM

## 2024-10-24 ENCOUNTER — LAB (OUTPATIENT)
Dept: LAB | Facility: CLINIC | Age: 49
End: 2024-10-24
Payer: COMMERCIAL

## 2024-10-24 DIAGNOSIS — D35.2 PITUITARY ADENOMA (H): ICD-10-CM

## 2024-10-24 LAB — CORTIS SERPL-MCNC: 6.1 UG/DL

## 2024-10-24 PROCEDURE — 82533 TOTAL CORTISOL: CPT

## 2024-10-24 PROCEDURE — 36415 COLL VENOUS BLD VENIPUNCTURE: CPT

## 2024-10-24 NOTE — PROGRESS NOTES
Virtual Visit Details    Type of service:  Video Visit   Video Start Time: 2:43 PM  Video End Time:3:14 PM    Originating Location (pt. Location): Home    Distant Location (provider location):  Off-site  Platform used for Video Visit: [facility]    Visit conducted via real-time audio/video technology by Aung Villatoro PA-C to the patient in their home.    Subjective      REFERRING PROVIDER  Reji Schilling MD    REASON FOR VISIT  Erectile dysfunction    HISTORY OF PRESENT ILLNESS  Mr. Sharma is a 49 year old male when speaking with today in regards to his bothersome erectile dysfunction.  His past medical history is significant for pituitary adenoma, hypertension, and trigeminal neuralgia.  I personally reviewed the endocrinology note from 10/10/2024 in preparation for today's visit.    According to that note, Ovidio is being referred to given issues with erectile dysfunction resistant to PDE 5 inhibitors without any evidence of low testosterone or elevated prolactin.    Today:  Erectile dysfunction present for about 10 years   Slow change  Main issue is maintaining the erection  Get get a 7/10 erection, but after penetration usually goes away quickly  Libido is not dropped at all   Previously tried some PDE5 inhibitors (Viagra early, tadalafil more recently)  Utilizes the tadalafil by taking it multiple hours beforehand  This produces some positive results  Tried a penile ring once     SOCIAL HISTORY  Former smoker, quit one year ago, off and on from early teens to 46 x 1.25 ppd    FAMILY HISTORY   Denies any known family history of urologic malignancy     REVIEW OF SYSTEMS   Review of Systems   Constitutional:  Negative for fatigue and unexpected weight change.   HENT:  Negative for hearing loss.    Eyes:  Negative for visual disturbance.   Respiratory:  Negative for shortness of breath.    Cardiovascular:  Negative for chest pain.   Gastrointestinal:  Positive for abdominal pain (Very chronic) and constipation  (chronic).   Genitourinary:  Negative for hematuria.   Musculoskeletal:  Positive for back pain (spinal stenosis and pinched nerves).   Neurological:  Positive for numbness (Lower extremities, improved after shots).   Hematological:  Negative for adenopathy.   Psychiatric/Behavioral:  Negative for sleep disturbance.       Objective      PHYSICAL EXAMINATION  Deferred given virtual visit.    LABS   Latest Reference Range & Units 09/28/24 09:53   FSH 1.5 - 12.4 mIU/mL 4.6   Glucose 70 - 99 mg/dL 99   Growth Hormone <1.3 ug/L <0.1   Luteinizing Hormone 1.7 - 8.6 mIU/mL 7.0   Prolactin 4 - 15 ng/mL 9   T4 Free 0.90 - 1.70 ng/dL 1.09   Testosterone Total 240 - 950 ng/dL 498   TSH 0.30 - 4.20 uIU/mL 1.12     Assessment & Plan    Erectile dysfunction     It was my pleasure to speak with Mr. Sharma today in regards to his erectile dysfunction. We reviewed the natural history of erectile dysfunction, its progression with age, and its importance as a marker of underlying microvascular or cardiovascular disease. We reviewed potential contributing factors to the development of erectile dysfunction and discussed a stepwise approach to management.    Treatment options discussed include:    Lifestyle changes: Discussed importance of glycemic control, weight loss, exercise, and smoking sensation as that can improve erectile function overall health.    PDE5I (sildenafil, tadalafil, vardenafil, avanafil): Discussed that if a patient doesn't respond adequately to PDE5-inhibitors, or has bothersome side effects, we may consider trialing an alternative medication. We noted that sexual stimulation is necessary and more than 1 trial may be required. However, many patients will ultimately require a higher dose of, or no longer respond to, these medications. Treatment escalation is indicated in this setting. Common side effects include facial flushing, headache, rhinorrhea, indigestion/dyspepsia, muscle aches, changes in color vision, and  light-headedness. Taking a PDE5-inhibitor is contraindicated if the patient is also taking a nitrogen containing medication such as nitroglycerin. The patient was informed of the importance of discussing this with his primary care provider and/or cardiologist.    ICI (intracavernosal injection of alprostadil, phentolamine, papaverine): Typically most effective of the medical treatment options. At one year, 40-70% of patients are still using ICI. Reasons to discontinue include lack of effect, lack of spontaneity, discomfort with the injection, or desire to trial other options. Pain with injection is typically mild and short lived, but on occasion, the medication may cause significant penile aching. This can be addressed by changing the concentration of the specific agents used, but may lead some patients to discontinue therapy. Patients are asked to rotate the site of injection to prevent scar tissue from building up at one site. In patients who are predisposed to form significant scarring on the penis in response to trauma, penile curvature or other shape changes may occur. If the patient is on anticoagulation, he would be at increased risk for hematoma formation at the site of injection.    DAVIN (vacuum erection device): Typically the least natural and most tedious of all options listed above. However, it is an excellent option for some men and can be well tolerated. Also, it is a great way to get blood flow into the penis and promote penile  physical therapy in an effort to preserve penile length. It can be used in combination with any of the medical therapies described above.    IPP (inflatable penile prosthesis): The most effective of all the options listed above and has a very high patient and partner satisfaction rate, often exceeding 80-90% in published studies. This approach allows the patient to achieve a firm/rigid erection sufficient for penetration that lasts as long as he wants, whenever he wants. It is  a surgery that requires general anesthesia and carries a 1-2% risk of infection, and 15-30% risk of mechanical failure at 10 years, requiring the device to be replaced.     Following the above discussion, Mr. Sharma is interested in proceeding with a change to his medications back to sildenafil 20 mg as needed with a max dose of 100 mg daily taken 30-60 minutes before anticipated sexual activity.  In regards to follow-up, I will plan to see him back in approximately 6 months, but I would like him to keep posted over [employer] with how things are going so that we can make small changes. Mr. Sharma expressed understanding and agreement to the above discussion and plan and all of his questions were answered to his satisfaction.       PLAN  Discontinuation of tadalafil and trial of sildenafil to be taken 30 to 60 minutes prior to anticipated sexual activity, dose can range from 20 mg all the way up to 100 mg in a 24-hour timeframe  Follow-up visit with me in 6 months    SIGNED    Aung Villatoro PA-C      I spent a total of 34 minutes spent on the date of the encounter doing chart review, history and exam, documentation, and further activities as noted above.

## 2024-10-25 ENCOUNTER — PRE VISIT (OUTPATIENT)
Dept: UROLOGY | Facility: CLINIC | Age: 49
End: 2024-10-25

## 2024-10-25 ENCOUNTER — VIRTUAL VISIT (OUTPATIENT)
Dept: UROLOGY | Facility: CLINIC | Age: 49
End: 2024-10-25
Attending: STUDENT IN AN ORGANIZED HEALTH CARE EDUCATION/TRAINING PROGRAM
Payer: COMMERCIAL

## 2024-10-25 DIAGNOSIS — N52.9 ERECTILE DYSFUNCTION, UNSPECIFIED ERECTILE DYSFUNCTION TYPE: Primary | ICD-10-CM

## 2024-10-25 PROCEDURE — 99204 OFFICE O/P NEW MOD 45 MIN: CPT | Mod: 95 | Performed by: STUDENT IN AN ORGANIZED HEALTH CARE EDUCATION/TRAINING PROGRAM

## 2024-10-25 RX ORDER — SILDENAFIL CITRATE 20 MG/1
20 TABLET ORAL DAILY PRN
Qty: 90 TABLET | Refills: 4 | Status: SHIPPED | OUTPATIENT
Start: 2024-10-25

## 2024-10-25 ASSESSMENT — ENCOUNTER SYMPTOMS
UNEXPECTED WEIGHT CHANGE: 0
NUMBNESS: 1
FATIGUE: 0
ADENOPATHY: 0
BACK PAIN: 1
HEMATURIA: 0
ABDOMINAL PAIN: 1
SHORTNESS OF BREATH: 0
CONSTIPATION: 1
SLEEP DISTURBANCE: 0

## 2024-10-25 NOTE — LETTER
10/25/2024       RE: Ovidio Sharma  [address] [place] [address]     Dear Colleague,    Thank you for referring your patient, Ovidio Sharma, to the [facility] UROLOGY CLINIC [place] at [facility]. Please see a copy of my visit note below.    Virtual Visit Details    Type of service:  Video Visit   Video Start Time: 2:43 PM  Video End Time:3:14 PM    Originating Location (pt. Location): Home    Distant Location (provider location):  Off-site  Platform used for Video Visit: [facility]    Visit conducted via real-time audio/video technology by Aung Villatoro PA-C to the patient in their home.    Subjective     REFERRING PROVIDER  Reji Schilling MD    REASON FOR VISIT  Erectile dysfunction    HISTORY OF PRESENT ILLNESS  Mr. Sharma is a 49 year old male when speaking with today in regards to his bothersome erectile dysfunction.  His past medical history is significant for pituitary adenoma, hypertension, and trigeminal neuralgia.  I personally reviewed the endocrinology note from 10/10/2024 in preparation for today's visit.    According to that note, Ovidio is being referred to given issues with erectile dysfunction resistant to PDE 5 inhibitors without any evidence of low testosterone or elevated prolactin.    Today:  Erectile dysfunction present for about 10 years   Slow change  Main issue is maintaining the erection  Get get a 7/10 erection, but after penetration usually goes away quickly  Libido is not dropped at all   Previously tried some PDE5 inhibitors (Viagra early, tadalafil more recently)  Utilizes the tadalafil by taking it multiple hours beforehand  This produces some positive results  Tried a penile ring once     SOCIAL HISTORY  Former smoker, quit one year ago, off and on from early teens to 46 x 1.25 ppd    FAMILY HISTORY   Denies any known family history of urologic malignancy     REVIEW OF SYSTEMS   Review of  Systems   Constitutional:  Negative for fatigue and unexpected weight change.   HENT:  Negative for hearing loss.    Eyes:  Negative for visual disturbance.   Respiratory:  Negative for shortness of breath.    Cardiovascular:  Negative for chest pain.   Gastrointestinal:  Positive for abdominal pain (Very chronic) and constipation (chronic).   Genitourinary:  Negative for hematuria.   Musculoskeletal:  Positive for back pain (spinal stenosis and pinched nerves).   Neurological:  Positive for numbness (Lower extremities, improved after shots).   Hematological:  Negative for adenopathy.   Psychiatric/Behavioral:  Negative for sleep disturbance.       Objective     PHYSICAL EXAMINATION  Deferred given virtual visit.    LABS   Latest Reference Range & Units 09/28/24 09:53   FSH 1.5 - 12.4 mIU/mL 4.6   Glucose 70 - 99 mg/dL 99   Growth Hormone <1.3 ug/L <0.1   Luteinizing Hormone 1.7 - 8.6 mIU/mL 7.0   Prolactin 4 - 15 ng/mL 9   T4 Free 0.90 - 1.70 ng/dL 1.09   Testosterone Total 240 - 950 ng/dL 498   TSH 0.30 - 4.20 uIU/mL 1.12     Assessment & Plan   Erectile dysfunction     It was my pleasure to speak with Mr. Sharma today in regards to his erectile dysfunction. We reviewed the natural history of erectile dysfunction, its progression with age, and its importance as a marker of underlying microvascular or cardiovascular disease. We reviewed potential contributing factors to the development of erectile dysfunction and discussed a stepwise approach to management.    Treatment options discussed include:    Lifestyle changes: Discussed importance of glycemic control, weight loss, exercise, and smoking sensation as that can improve erectile function overall health.    PDE5I (sildenafil, tadalafil, vardenafil, avanafil): Discussed that if a patient doesn't respond adequately to PDE5-inhibitors, or has bothersome side effects, we may consider trialing an alternative medication. We noted that sexual stimulation is necessary  and more than 1 trial may be required. However, many patients will ultimately require a higher dose of, or no longer respond to, these medications. Treatment escalation is indicated in this setting. Common side effects include facial flushing, headache, rhinorrhea, indigestion/dyspepsia, muscle aches, changes in color vision, and light-headedness. Taking a PDE5-inhibitor is contraindicated if the patient is also taking a nitrogen containing medication such as nitroglycerin. The patient was informed of the importance of discussing this with his primary care provider and/or cardiologist.    ICI (intracavernosal injection of alprostadil, phentolamine, papaverine): Typically most effective of the medical treatment options. At one year, 40-70% of patients are still using ICI. Reasons to discontinue include lack of effect, lack of spontaneity, discomfort with the injection, or desire to trial other options. Pain with injection is typically mild and short lived, but on occasion, the medication may cause significant penile aching. This can be addressed by changing the concentration of the specific agents used, but may lead some patients to discontinue therapy. Patients are asked to rotate the site of injection to prevent scar tissue from building up at one site. In patients who are predisposed to form significant scarring on the penis in response to trauma, penile curvature or other shape changes may occur. If the patient is on anticoagulation, he would be at increased risk for hematoma formation at the site of injection.    DAVIN (vacuum erection device): Typically the least natural and most tedious of all options listed above. However, it is an excellent option for some men and can be well tolerated. Also, it is a great way to get blood flow into the penis and promote penile  physical therapy in an effort to preserve penile length. It can be used in combination with any of the medical therapies described above.    IPP  (inflatable penile prosthesis): The most effective of all the options listed above and has a very high patient and partner satisfaction rate, often exceeding 80-90% in published studies. This approach allows the patient to achieve a firm/rigid erection sufficient for penetration that lasts as long as he wants, whenever he wants. It is a surgery that requires general anesthesia and carries a 1-2% risk of infection, and 15-30% risk of mechanical failure at 10 years, requiring the device to be replaced.     Following the above discussion, Mr. Sharma is interested in proceeding with a change to his medications back to sildenafil 20 mg as needed with a max dose of 100 mg daily taken 30-60 minutes before anticipated sexual activity.  In regards to follow-up, I will plan to see him back in approximately 6 months, but I would like him to keep posted over [employer] with how things are going so that we can make small changes. Mr. Sharma expressed understanding and agreement to the above discussion and plan and all of his questions were answered to his satisfaction.       PLAN  Discontinuation of tadalafil and trial of sildenafil to be taken 30 to 60 minutes prior to anticipated sexual activity, dose can range from 20 mg all the way up to 100 mg in a 24-hour timeframe  Follow-up visit with me in 6 months    SIGNED    Aung Villatoro PA-C      I spent a total of 34 minutes spent on the date of the encounter doing chart review, history and exam, documentation, and further activities as noted above.      Again, thank you for allowing me to participate in the care of your patient.      Sincerely,    Aung Villatoro PA-C

## 2024-10-25 NOTE — NURSING NOTE
Current patient location: [address] [place] [address] [address] [place] [address]    Is the patient currently in the state of [place]? YES    Visit mode:VIDEO    If the visit is dropped, the patient can be reconnected by: VIDEO VISIT: Text to cell phone:   Telephone Information:   Mobile [phone]       Will anyone else be joining the visit? NO  (If patient encounters technical issues they should call [phone]    Are changes needed to the allergy or medication list? Pt stated no med changes    Are refills needed on medications prescribed by this physician? NO- new pt     Rooming Documentation:  Not applicable    Reason for visit: Consult (New Erectile Dysfunction )    Shnana DE PAZ

## 2024-11-08 ENCOUNTER — TELEPHONE (OUTPATIENT)
Dept: UROLOGY | Facility: CLINIC | Age: 49
End: 2024-11-08
Payer: COMMERCIAL

## 2024-11-08 NOTE — TELEPHONE ENCOUNTER
Patient confirmed scheduled appointment:  Date: May 1st   Time: 1:30pm  Visit type: Return   Provider: Anil Villatoro   [place] [facility] [place]  Additional notes: per check out - Follow-up visit with me in 6 months

## 2024-12-11 ENCOUNTER — TELEPHONE (OUTPATIENT)
Dept: ENDOCRINOLOGY | Facility: CLINIC | Age: 49
End: 2024-12-11
Payer: COMMERCIAL

## 2024-12-11 NOTE — TELEPHONE ENCOUNTER
Patient confirmed scheduled appointment:  Date: 10/9  Time: 10 am   Visit type: return pituitary   Provider: Tonie   [place] [facility] [place]   Testing/imaging:   Additional notes: Spoke to pt and changed time due to changes in the providers template. Pt agreed to new time     Josephine Osuna on 12/11/2024 at 1:25 PM

## 2024-12-29 ENCOUNTER — MYC MEDICAL ADVICE (OUTPATIENT)
Dept: NEUROSURGERY | Facility: CLINIC | Age: 49
End: 2024-12-29
Payer: COMMERCIAL

## 2024-12-29 DIAGNOSIS — D35.2 PITUITARY ADENOMA (H): Primary | ICD-10-CM

## 2025-01-11 ENCOUNTER — HEALTH MAINTENANCE LETTER (OUTPATIENT)
Age: 50
End: 2025-01-11

## 2025-04-22 ENCOUNTER — PRE VISIT (OUTPATIENT)
Dept: UROLOGY | Facility: CLINIC | Age: 50
End: 2025-04-22
Payer: COMMERCIAL

## 2025-04-22 NOTE — TELEPHONE ENCOUNTER
Reason for visit: Return patient     Relevant information: ED    Records/imaging/labs/orders: Sildenafil check    At Rooming: Virtual visit      Russ Morales  4/22/2025  4:50 PM

## 2025-04-25 NOTE — PROGRESS NOTES
Virtual Visit Details    Type of service:  Video Visit   Video Start Time: 1:10 PM  Video End Time:1:21 PM    Originating Location (pt. Location): Home    Distant Location (provider location):  Off-site  Platform used for Video Visit: [facility]    Visit conducted via real-time audio/video technology by Aung Villatoro PA-C to the patient in their home.    Subjective      REASON FOR VISIT  Erectile dysfunction follow-up     HISTORY OF PRESENT ILLNESS  Mr. Sharma is a 49 year old male who I am speaking with today in follow-up for his bothersome erectile dysfunction currently trying sildenafil.  I originally met with Ovidio on 10/25/2024 at which time he had endorsed a 10-year history of erectile dysfunction in the absence of any low testosterone values.  He had trialed multiple oral medications before, but was most interested in trialing sildenafil again at the end of our discussion in October.  Plans to follow-up 6 months later.    Today:  Tried taking up to 5 pills of sildenafil and it would get him a 10/10 erection, but then would go away before penetration     Objective      PHYSICAL EXAMINATION  Deferred given virtual visit.    Assessment & Plan    Erectile dysfunction     It was my pleasure to speak with Ovidio in follow-up for his bothersome erectile dysfunction currently trialing sildenafil.  He endorsed today that the sildenafil when using upwards of 5 pills can get him a very strong erection, but this is not something that is lasting.  With this in mind, my main recommendation at this point is to continue utilizing the sildenafil at doses that work for him as well is paring this with a penile constriction ring made of silicone.  I described the mechanism of how this works, specifically helping compress the veins in the penis to stop the blood from flowing out and losing the erection.  He will look to purchase 1 of these and attempt to use them in tandem with the medications.    We will plan a 1 year follow-up  which she can keep assuming everything is going well.  However, if he continues to have issues, I want him to reach out to me over [employer] so we can consider getting him set up for ICI teaching with our nursing staff.    Mr. Sharma expressed understanding and agreement to the above discussion and plan and all of his questions were answered to his satisfaction.     PLAN  Continue on sildenafil as needed with the addition of a silicone penile constriction ring, refill sent to get him through another year  Follow-up virtual visit with me in 1 year, to be changed if he sends a [employer] message    SIGNED    Aung Villatoro PA-C      I spent a total of 15 minutes spent on the date of the encounter doing chart review, history and exam, documentation, and further activities as noted above.

## 2025-05-01 ENCOUNTER — VIRTUAL VISIT (OUTPATIENT)
Dept: UROLOGY | Facility: CLINIC | Age: 50
End: 2025-05-01
Payer: COMMERCIAL

## 2025-05-01 DIAGNOSIS — N52.9 ERECTILE DYSFUNCTION, UNSPECIFIED ERECTILE DYSFUNCTION TYPE: ICD-10-CM

## 2025-05-01 RX ORDER — SILDENAFIL CITRATE 20 MG/1
20 TABLET ORAL DAILY PRN
Qty: 90 TABLET | Refills: 4 | Status: SHIPPED | OUTPATIENT
Start: 2025-05-01 | End: 2025-05-01

## 2025-05-01 RX ORDER — SILDENAFIL CITRATE 20 MG/1
20 TABLET ORAL DAILY PRN
Qty: 90 TABLET | Refills: 4 | Status: SHIPPED | OUTPATIENT
Start: 2025-05-01

## 2025-05-01 NOTE — LETTER
5/1/2025       RE: Ovidio Sharma  [address]  [address] [place] [address]     Dear Colleague,    Thank you for referring your patient, Ovidio Sharma, to the [facility] UROLOGY CLINIC [place] at [facility]. Please see a copy of my visit note below.    Virtual Visit Details    Type of service:  Video Visit   Video Start Time: 1:10 PM  Video End Time:1:21 PM    Originating Location (pt. Location): Home    Distant Location (provider location):  Off-site  Platform used for Video Visit: [facility]    Visit conducted via real-time audio/video technology by Aung Villatoro PA-C to the patient in their home.    Subjective     REASON FOR VISIT  Erectile dysfunction follow-up     HISTORY OF PRESENT ILLNESS  Mr. Sharma is a 49 year old male who I am speaking with today in follow-up for his bothersome erectile dysfunction currently trying sildenafil.  I originally met with Ovidio on 10/25/2024 at which time he had endorsed a 10-year history of erectile dysfunction in the absence of any low testosterone values.  He had trialed multiple oral medications before, but was most interested in trialing sildenafil again at the end of our discussion in October.  Plans to follow-up 6 months later.    Today:  Tried taking up to 5 pills of sildenafil and it would get him a 10/10 erection, but then would go away before penetration     Objective     PHYSICAL EXAMINATION  Deferred given virtual visit.    Assessment & Plan   Erectile dysfunction     It was my pleasure to speak with Ovidio in follow-up for his bothersome erectile dysfunction currently trialing sildenafil.  He endorsed today that the sildenafil when using upwards of 5 pills can get him a very strong erection, but this is not something that is lasting.  With this in mind, my main recommendation at this point is to continue utilizing the sildenafil at doses that work for him as well is paring this with a  penile constriction ring made of silicone.  I described the mechanism of how this works, specifically helping compress the veins in the penis to stop the blood from flowing out and losing the erection.  He will look to purchase 1 of these and attempt to use them in tandem with the medications.    We will plan a 1 year follow-up which she can keep assuming everything is going well.  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    Mr. Sharma expressed understanding and agreement to the above discussion and plan and all of his questions were answered to his satisfaction.     PLAN  Continue on sildenafil as needed with the addition of a silicone penile constriction ring, refill sent to get him through another year  Follow-up virtual visit with me in 1 year, to be changed if he sends a [employer] message    SIGNED    Aung Villatoro PA-C      I spent a total of 15 minutes spent on the date of the encounter doing chart review, history and exam, documentation, and further activities as noted above.      Again, thank you for allowing me to participate in the care of your patient.      Sincerely,    Aung Villatoro PA-C

## 2025-05-01 NOTE — NURSING NOTE
Current patient location: [place]    Is the patient currently in the state of [place]? YES    Visit mode: VIDEO    If the visit is dropped, the patient can be reconnected by:VIDEO VISIT: Text to cell phone:   Telephone Information:   Mobile [phone]       Will anyone else be joining the visit? NO  (If patient encounters technical issues they should call [phone])    Are changes needed to the allergy or medication list? E-check in  was reviewed/completed for today's visit. VF did not review e-check in information again with Ovidio due to this.       Are refills needed on medications prescribed by this physician? Discuss with provider    Rooming Documentation:  Patient declined to complete questionnaire(s)    Reason for visit: RECHECK    Slime DE PAZ

## 2025-08-18 ENCOUNTER — MYC MEDICAL ADVICE (OUTPATIENT)
Dept: NEUROSURGERY | Facility: CLINIC | Age: 50
End: 2025-08-18
Payer: COMMERCIAL

## 2025-08-18 DIAGNOSIS — G50.0 TRIGEMINAL NEURALGIA: Primary | ICD-10-CM

## 2025-08-18 RX ORDER — CARBAMAZEPINE 100 MG/1
100 TABLET, EXTENDED RELEASE ORAL 2 TIMES DAILY
Qty: 180 TABLET | Refills: 1 | Status: SHIPPED | OUTPATIENT
Start: 2025-08-18

## 2025-09-02 ENCOUNTER — TELEPHONE (OUTPATIENT)
Dept: ENDOCRINOLOGY | Facility: CLINIC | Age: 50
End: 2025-09-02
Payer: COMMERCIAL

## (undated) RX ORDER — LIDOCAINE HYDROCHLORIDE 10 MG/ML
INJECTION, SOLUTION INFILTRATION; PERINEURAL
Status: DISPENSED
Start: 2017-09-15

## (undated) RX ORDER — FENTANYL CITRATE 50 UG/ML
INJECTION, SOLUTION INTRAMUSCULAR; INTRAVENOUS
Status: DISPENSED
Start: 2017-09-27

## (undated) RX ORDER — BUPIVACAINE HYDROCHLORIDE 5 MG/ML
INJECTION, SOLUTION EPIDURAL; INTRACAUDAL
Status: DISPENSED
Start: 2017-09-15